# Patient Record
Sex: MALE | Race: WHITE | URBAN - METROPOLITAN AREA
[De-identification: names, ages, dates, MRNs, and addresses within clinical notes are randomized per-mention and may not be internally consistent; named-entity substitution may affect disease eponyms.]

---

## 2018-12-13 ENCOUNTER — ANCILLARY PROCEDURE (OUTPATIENT)
Dept: GENERAL RADIOLOGY | Facility: CLINIC | Age: 23
End: 2018-12-13
Attending: FAMILY MEDICINE
Payer: COMMERCIAL

## 2018-12-13 ENCOUNTER — OFFICE VISIT (OUTPATIENT)
Dept: ORTHOPEDICS | Facility: CLINIC | Age: 23
End: 2018-12-13
Payer: COMMERCIAL

## 2018-12-13 VITALS — BODY MASS INDEX: 23.1 KG/M2 | HEIGHT: 71 IN | WEIGHT: 165 LBS

## 2018-12-13 DIAGNOSIS — R10.2 PERINEUM PAIN, MALE: ICD-10-CM

## 2018-12-13 DIAGNOSIS — R10.2 NEUROPATHIC PAIN OF PERINEUM: Primary | ICD-10-CM

## 2018-12-13 DIAGNOSIS — M54.16 ACUTE LUMBAR RADICULOPATHY: ICD-10-CM

## 2018-12-13 RX ORDER — PREDNISONE 20 MG/1
40 TABLET ORAL DAILY
Qty: 10 TABLET | Refills: 0 | Status: SHIPPED | OUTPATIENT
Start: 2018-12-13 | End: 2019-03-12

## 2018-12-13 ASSESSMENT — MIFFLIN-ST. JEOR: SCORE: 1770.57

## 2018-12-13 NOTE — LETTER
12/13/2018       RE: Elias Jarrell  [address]  [facility] [address]     Dear Colleague,    Thank you for referring your patient, Elias Jarrell, to the [facility] SPORTS AND ORTHOPAEDIC WALK IN CLINIC at [facility]. Please see a copy of my visit note below.         NEW PATIENT INTAKE QUESTIONNAIRE  SPORTS & ORTHOPEDIC WALK-IN 12/13/2018    Primary Care Physician: None    Where are the majority of your medical records? [facility]  Last night while strength training, doing row exercises, felt a sharp pain start in groin/genital region. Achy pain today. Pain from sitting to standing and with flexion.    Reason for Visit:    What part of your body is injured / painful?  groin    What caused the injury /pain? Lifting weights- rowing exercise    How long ago did your injury occur or pain begin? yesterday    What are your most bothersome symptoms? Pain    How would you characterize your symptom? aching and sharp    What makes your symptoms better? Rest    What makes your symptoms worse? Movement    Have you been previously seen for this problem? No    Medical History:    Medical History: Anxiety    Have you had surgery on this body part before? No    Medications: None    Allergies: No known drug allergies    Family History of Medical Problems: Heart disease, breast cancer, Diabetes    Previous Surgeries: [place] teeth    Social History:    Occupation: Scribe    Handedness: Right    Exercise: 3-4 days/week    Review of Systems:    Have you recently had a a fever, chills, weight loss? No    Do you have any vision problems? No    Do you have any chest pain or edema? No    Do you have any shortness of breath or wheezing?  No    Do you have stomach problems? Yes, Heartburn    Do you have any numbness or focal weakness? No    Do you have diabetes? No    Do you have problems with bleeding or clotting? No    Do you have an rashes or other skin lesions? No             CHIEF  "COMPLAINT:  No chief complaint on file.       HISTORY OF PRESENT ILLNESS  Mr. Jarrell is a pleasant 23 year old year old male who presents to clinic today with pain of his groin.  Elias explains that he was at the gym lifting weights.  He completed a \"back workout\" including standing rows, lat pulldowns, and seated rows yesterday.  First noticed pain during seated rows and stopped.  He felt initially with seated rows and increased intensity with picking up an EZ-bar from the floor with lighter weights.  Discontinued workout after this time.  Describes as certain movements make it sharp, otherwise achy.  Worse with sitting to standing, rolling over, bending over and picking up things. Improved at rest. Laying on back resolves pain completely.  Does not declare a laterality fully but \"maybe slightly to the left\" and mostly central perineum area.      Mild soreness of low back.  Pain is not worse with coughing, urinating. Has not defecated.    Denies tingling sensation.    Between the legs.   No dysuria, hematuria, urinary symptoms.    No recent sexual intercourse - \"been a while\" 6 mos or so.    Treatments to date: Rest from offending activity including cessation of last night's workout.    Additional history: as documented      MEDICAL HISTORY  Anxiety    No current outpatient medications on file.       No Known Allergies    Family History of Medical Problems: Heart disease, breast cancer, DiabetesFamily History of Medical Problems: Heart disease, breast cancer, Diabetes  Additional medical/Social/Surgical histories reviewed in [facility] and updated as appropriate.     REVIEW OF SYSTEMS (12/13/2018)  CONSTITUTIONAL: Denies fever and weight loss  EYES: Denies acute vision changes  ENT: Denies hearing changes or difficulty swallowing  CARDIAC: Denies chest pain or edema  RESPIRATORY: Denies dyspnea, cough or wheeze  GASTROINTESTINAL: Denies abdominal pain, nausea, vomiting  GENITOURINARY: No hematuria, dysuria, discharge, " "lesions  MUSCULOSKELETAL: See HPI  SKIN: Denies any recent rash or lesion  NEUROLOGICAL: Denies numbness or focal weakness  PSYCHIATRIC: No history of psychiatric symptoms or problems  ENDOCRINE: Diagnosis of diabetes:No  HEMATOLOGY: Denies episodes of easy bleeding      PHYSICAL EXAM  Ht 1.803 m (5' 11\")   Wt 74.8 kg (165 lb)   BMI 23.01 kg/m       General Appearance: Well appearing, alert, in no acute distress, well-hydrated, and well nourished  HEENT  -Pupils equal, round, no conjunctival injection.  No lid lag  Skin: No rashes, lesions, or ecchymosis present of genitals and thighs  Cardiovascular: pedal pulses and radial pulses normal, no signs of upper or lower extremity edema  Respiratory: no respiratory distress, no audible wheezing, no labored breathing, symmetric thoracic excursion  : Genitals appearing without lesions. Testicles and penis nontender without masses or abnormalities. No discharge. Non-tender ductus deferens and epididymus.  No palpable bulges or impulses of inguinal canal with valsalva.  No superficial abnormalities or erythema of perineum.  Psychiatric: mood and affect are appropriate, patient is oriented to time, place and person  Musculoskeletal - lumbar spine  - stance: Normal gait and stance  - inspection: normal bone and joint alignment, no obvious scoliosis  - palpation:Mild tenderness bilateral lumbar paraspinals to palpation. No significant spasm.  - ROM: Increased intensity of perineal pain with forward flexion to 60+ degrees.  Mild increased pain with hyperextension.  Normal and painless sidebending and rotation.  - strength: lower extremities 5/5 in all planes  - special tests:  (-) straight leg raise bilaterally  (-) slump test  Neuro  - patellar and Achilles DTRs 2+ bilaterally, no sensory or motor deficit, grossly normal coordination, normal muscle tone  Skin  - no ecchymosis, erythema, warmth, or induration, no obvious rash  Psych  - interactive, appropriate, normal mood " "and affect     ASSESSMENT & PLAN  Mr. Jarrell is a 23 year old year old male who presents to clinic today with acute perineal pain and mild back aching after performing his \"back workout\" yesterday at the gym.  No symptoms or recent sexual encounters to suggest epididymitis or sexual transmitted disease.  However he will also see PCP for well male examination as it has been 5+ years.    Diagnosis: Acute perineal pain, lumbar radiculopathy suspected    -Prednisone x 5 days  -Refraining from lumbar flexion exercises  -HEP for extension based strengthening  -Worrisome or evolving symptoms discussed and will follow-up  -Follow up in 2 weeks if persisting.    It was a pleasure seeing Elias today.    Pj Robles DO, [facility]  Primary Care Sports Medicine      Again, thank you for allowing me to participate in the care of your patient.      Sincerely,    Pj Robles DO      "

## 2018-12-13 NOTE — PROGRESS NOTES
NEW PATIENT INTAKE QUESTIONNAIRE  SPORTS & ORTHOPEDIC WALK-IN 12/13/2018    Primary Care Physician: None    Where are the majority of your medical records? [facility]  Last night while strength training, doing row exercises, felt a sharp pain start in groin/genital region. Achy pain today. Pain from sitting to standing and with flexion.    Reason for Visit:    What part of your body is injured / painful?  groin    What caused the injury /pain? Lifting weights- rowing exercise    How long ago did your injury occur or pain begin? yesterday    What are your most bothersome symptoms? Pain    How would you characterize your symptom? aching and sharp    What makes your symptoms better? Rest    What makes your symptoms worse? Movement    Have you been previously seen for this problem? No    Medical History:    Medical History: Anxiety    Have you had surgery on this body part before? No    Medications: None    Allergies: No known drug allergies    Family History of Medical Problems: Heart disease, breast cancer, Diabetes    Previous Surgeries: [place] teeth    Social History:    Occupation: Scribe    Handedness: Right    Exercise: 3-4 days/week    Review of Systems:    Have you recently had a a fever, chills, weight loss? No    Do you have any vision problems? No    Do you have any chest pain or edema? No    Do you have any shortness of breath or wheezing?  No    Do you have stomach problems? Yes, Heartburn    Do you have any numbness or focal weakness? No    Do you have diabetes? No    Do you have problems with bleeding or clotting? No    Do you have an rashes or other skin lesions? No

## 2018-12-13 NOTE — PATIENT INSTRUCTIONS
WHAT IS A HERNIATED DISK?    A herniated disk is a disk that has bulged out from its proper place in your neck or back. Disks are rubbery cushions between the bones of the spine (vertebrae). Disks act as shock absorbers between each of the bones of the spine. When a disk bulges out, it may press on nearby nerves and cause pain and other symptoms.    Sometimes a herniated disk is called a ruptured disk.    WHAT IS THE CAUSE?    A herniated disk most often results from wear and tear on the spine as you get older. Sometimes it s caused by an injury. You may be more likely to have a herniated disk if you keep straining your back. This could happen, for example, from not using proper technique when you lift, push, or pull something heavy. Being overweight can also put extra stress on your back. You may also be at higher risk for a herniated disk if:    You are a smoker.  You sit for long periods of time without lower back support.  You drive a lot--for example, you are a .  WHAT ARE THE SYMPTOMS?    Symptoms of a herniated disk may start slowly or suddenly. Where you have symptoms depends on where the herniated disk is in your spine. The most common symptoms are numbness, tingling, pain, or weakness in your buttocks, shoulders, legs, or arms.    HOW IS IT DIAGNOSED?    Your healthcare provider will ask about your symptoms, activities, and medical history. Your provider will examine your spine. Tests may include:    Tests of the movement and reflexes of your arms and legs  X-rays or other types of scans of your spine  Electromyogram, which is a test of electrical activity in your muscles  HOW IS IT TREATED?    Your healthcare provider may recommend:    Rest. It's best to try to stay active, so try not to rest in bed longer than 1 to 2 days or the time your provider recommends.  Medicine. Several types of medicines may help lessen back pain. It may be medicine you take by mouth, or your provider may give a  steroid shot into your spine. Take all medicine as recommended by your healthcare provider.  Physical therapy. This may include massage, traction (force applied to your spine to help relieve pressure on your nerves), or other treatments. You may be given exercises to help strengthen your back so you are less likely to hurt it. You may learn how to protect your back when you are working or playing sports.  A neck collar or neck brace. Wearing a brace for a short time may help keep your neck in the right position while it is healing.  With treatment, the pain should get better within a few weeks, but you may keep having some pain for a few months. If you keep having symptoms, your provider may recommend surgery, but usually surgery isn t needed.    HOW CAN I TAKE CARE OF MYSELF?    To help relieve pain:    Take pain medicine according to your healthcare provider s instructions.  Put an ice pack, gel pack, or package of frozen vegetables wrapped in a cloth on the painful area every 3 to 4 hours for up to 20 minutes at a time. After a few days, a heating pad set on low, or a covered hot water bottle, may also help.  Always use good posture to keep extra pressure off your spine.    Stand up straight with your shoulders back and your belly in. If you have to stand for a long time, move around often and shift your weight from one foot to another. If possible, put one foot up on a footrest that is about 6 to 8 inches high. This keeps your back straight and puts less pressure on your spine.  Sit in chairs that give good support for your lower back Keep your feet flat on the floor or up on a foot rest. Get up every 20 minutes or so and stretch.  When you need to lift something heavy, don't bend from your waist. Bend your knees and squat down by the thing you are lifting. Keep your back as straight as possible. Use your thigh muscles instead of your back to do the lifting. Don t twist. Always keep things close to your body  when you lift, lower, or carry them.    When you sleep, find the position that s most comfortable for you and that supports your back. For example:    Lie flat on your back on a firm mattress or on a mattress with a stiff board under it. Put a pillow under your knees when you lie on your back.  Lie on your belly with a pillow under your chest  Lie on your side with a pillow between your legs.  If you cannot get comfortable, try lying flat on your back with your legs raised so that your knees are bent at a 90-degree angle. This is the same angle they would be if you were sitting up straight in a chair. One way to rest in this position is to lie on the floor, bend your knees, and rest your lower legs on the seat of a chair.  Follow your healthcare provider's instructions, including any exercises recommended by your provider. Ask your provider:    How and when you will hear your test results  How long it will take to recover  What activities you should avoid and when you can return to your normal activities  How to take care of yourself at home  What symptoms or problems you should watch for and what to do if you have them  Make sure you know when you should come back for a checkup.    HOW CAN I HELP PREVENT A HERNIATED DISK?    Keep your muscles strong so that they can help support your spine better. Walking and swimming are examples of good exercise for strengthening and protecting your spine.  Lose weight if you are overweight.  Practice good posture.  Developed by [employer].  Published by [employer].  Copyright  2014 [employer] and/or one of its subsidiaries. All rights reserved.    References        Herniated Disc Exercises    Side plank: Lie on your side with your legs, hips, and shoulders in a straight line. Prop yourself up onto your forearm with your elbow directly under your shoulder. Lift your hips off the floor and balance on your forearm and the outside of your foot. Try to hold this position  for 15 seconds and then slowly lower your hip to the ground. Switch sides and repeat. Work up to holding for 1 minute. This exercise can be made easier by starting with your knees and hips flexed toward your chest.    Gluteal stretch: Lie on your back with both knees bent. Rest your right ankle over the knee of your left leg. Grasp the thigh of the left leg and pull toward your chest. You will feel a stretch along the buttocks and possibly along the outside of your hip. Hold the stretch for 15 to 30 seconds. Then repeat the exercise with your left ankle over your right knee. Do the exercise 3 times with each leg.    Quadruped arm and leg raise: Get down on your hands and knees. Pull in your belly button and tighten your abdominal muscles to stiffen your spine. While keeping your abdominals tight, raise one arm and the opposite leg away from you. Hold this position for 5 seconds. Lower your arm and leg slowly and change sides. Do this 10 times on each side.    Extension exercise  Lie face down on the floor for 5 minutes. If this hurts too much, lie face down with a pillow under your stomach. This should relieve your leg or back pain. When you can lie on your stomach for 5 minutes without a pillow, you can continue with Part B of this exercise.    After lying on your stomach for 5 minutes, prop yourself up on your elbows for another 5 minutes. If you can do this without having more leg or buttock pain, you can start doing part C of this exercise.    Lie on your stomach with your hands under your shoulders. Then press down on your hands and extend your elbows while keeping your hips flat on the floor. Hold for 1 second and lower yourself to the floor. Do 3 to 5 sets of 10 repetitions. Rest for 1 minute between sets. You should have no pain in your legs when you do this, but it is normal to feel some pain in your lower back.    Do this exercise several times a day.    Dead bug: Lie on your back with your knees bent,  arms at your sides, and feet flat on the floor. Draw in your abdomen and tighten your abdominal muscles. While keeping your abdominal muscles tight and knees bent, lift one leg several inches off the floor, hold for 5 seconds, and then lower it. Repeat this exercise with the opposite leg. Then lift your arm over your head, hold for 5 seconds, and then lower it. Repeat with the opposite arm. Do 5 repetitions with each leg and arm.  Once this exercise gets easy, raise one leg and the opposite arm together. Hold for 5 seconds. Lower your arm and leg and raise the opposite arm and leg up and hold for 5 seconds. Do 3 sets of 5 repetitions.    Walking is also good exercise for you.    If you have a herniated disk, you should not drive or sit for more than 30 minutes at a time.    Developed by [employer].  Published by [employer].  Copyright  2014 [employer] and/or one of its subsidiaries. All rights reserved.

## 2018-12-13 NOTE — LETTER
Date:December 19, 2018      Patient was self referred, no letter generated. Do not send.        [facility] Information

## 2018-12-13 NOTE — PROGRESS NOTES
"CHIEF COMPLAINT:  No chief complaint on file.       HISTORY OF PRESENT ILLNESS  Mr. Jarrell is a pleasant 23 year old year old male who presents to clinic today with pain of his groin.  Elias explains that he was at the gym lifting weights.  He completed a \"back workout\" including standing rows, lat pulldowns, and seated rows yesterday.  First noticed pain during seated rows and stopped.  He felt initially with seated rows and increased intensity with picking up an EZ-bar from the floor with lighter weights.  Discontinued workout after this time.  Describes as certain movements make it sharp, otherwise achy.  Worse with sitting to standing, rolling over, bending over and picking up things. Improved at rest. Laying on back resolves pain completely.  Does not declare a laterality fully but \"maybe slightly to the left\" and mostly central perineum area.      Mild soreness of low back.  Pain is not worse with coughing, urinating. Has not defecated.    Denies tingling sensation.    Between the legs.   No dysuria, hematuria, urinary symptoms.    No recent sexual intercourse - \"been a while\" 6 mos or so.    Treatments to date: Rest from offending activity including cessation of last night's workout.    Additional history: as documented      MEDICAL HISTORY  Anxiety    No current outpatient medications on file.       No Known Allergies    Family History of Medical Problems: Heart disease, breast cancer, DiabetesFamily History of Medical Problems: Heart disease, breast cancer, Diabetes  Additional medical/Social/Surgical histories reviewed in [facility] and updated as appropriate.     REVIEW OF SYSTEMS (12/13/2018)  CONSTITUTIONAL: Denies fever and weight loss  EYES: Denies acute vision changes  ENT: Denies hearing changes or difficulty swallowing  CARDIAC: Denies chest pain or edema  RESPIRATORY: Denies dyspnea, cough or wheeze  GASTROINTESTINAL: Denies abdominal pain, nausea, vomiting  GENITOURINARY: No hematuria, dysuria, " "discharge, lesions  MUSCULOSKELETAL: See HPI  SKIN: Denies any recent rash or lesion  NEUROLOGICAL: Denies numbness or focal weakness  PSYCHIATRIC: No history of psychiatric symptoms or problems  ENDOCRINE: Diagnosis of diabetes:No  HEMATOLOGY: Denies episodes of easy bleeding      PHYSICAL EXAM  Ht 1.803 m (5' 11\")   Wt 74.8 kg (165 lb)   BMI 23.01 kg/m      General Appearance: Well appearing, alert, in no acute distress, well-hydrated, and well nourished  HEENT  -Pupils equal, round, no conjunctival injection.  No lid lag  Skin: No rashes, lesions, or ecchymosis present of genitals and thighs  Cardiovascular: pedal pulses and radial pulses normal, no signs of upper or lower extremity edema  Respiratory: no respiratory distress, no audible wheezing, no labored breathing, symmetric thoracic excursion  : Genitals appearing without lesions. Testicles and penis nontender without masses or abnormalities. No discharge. Non-tender ductus deferens and epididymus.  No palpable bulges or impulses of inguinal canal with valsalva.  No superficial abnormalities or erythema of perineum.  Psychiatric: mood and affect are appropriate, patient is oriented to time, place and person  Musculoskeletal - lumbar spine  - stance: Normal gait and stance  - inspection: normal bone and joint alignment, no obvious scoliosis  - palpation:Mild tenderness bilateral lumbar paraspinals to palpation. No significant spasm.  - ROM: Increased intensity of perineal pain with forward flexion to 60+ degrees.  Mild increased pain with hyperextension.  Normal and painless sidebending and rotation.  - strength: lower extremities 5/5 in all planes  - special tests:  (-) straight leg raise bilaterally  (-) slump test  Neuro  - patellar and Achilles DTRs 2+ bilaterally, no sensory or motor deficit, grossly normal coordination, normal muscle tone  Skin  - no ecchymosis, erythema, warmth, or induration, no obvious rash  Psych  - interactive, appropriate, " "normal mood and affect     ASSESSMENT & PLAN  Mr. Jarrell is a 23 year old year old male who presents to clinic today with acute perineal pain and mild back aching after performing his \"back workout\" yesterday at the gym.  No symptoms or recent sexual encounters to suggest epididymitis or sexual transmitted disease.  However he will also see PCP for well male examination as it has been 5+ years.    Diagnosis: Acute perineal pain, lumbar radiculopathy suspected    -Prednisone x 5 days  -Refraining from lumbar flexion exercises  -HEP for extension based strengthening  -Worrisome or evolving symptoms discussed and will follow-up  -Follow up in 2 weeks if persisting.    It was a pleasure seeing Elias today.    Pj Robles DO, [facility]  Primary Care Sports Medicine    "

## 2019-01-22 ENCOUNTER — OFFICE VISIT (OUTPATIENT)
Dept: FAMILY MEDICINE | Facility: CLINIC | Age: 24
End: 2019-01-22
Payer: COMMERCIAL

## 2019-01-22 VITALS
SYSTOLIC BLOOD PRESSURE: 115 MMHG | HEART RATE: 73 BPM | HEIGHT: 70 IN | BODY MASS INDEX: 23.92 KG/M2 | RESPIRATION RATE: 20 BRPM | DIASTOLIC BLOOD PRESSURE: 72 MMHG | TEMPERATURE: 98.4 F | WEIGHT: 167.1 LBS | OXYGEN SATURATION: 96 %

## 2019-01-22 DIAGNOSIS — Z00.00 HEALTH CARE MAINTENANCE: Primary | ICD-10-CM

## 2019-01-22 DIAGNOSIS — F41.9 ANXIETY: ICD-10-CM

## 2019-01-22 RX ORDER — CALCIUM CARBONATE 500 MG/1
1 TABLET, CHEWABLE ORAL 2 TIMES DAILY PRN
COMMUNITY

## 2019-01-22 RX ORDER — CETIRIZINE HYDROCHLORIDE 10 MG/1
10 TABLET ORAL DAILY
COMMUNITY

## 2019-01-22 SDOH — HEALTH STABILITY: MENTAL HEALTH: HOW MANY STANDARD DRINKS CONTAINING ALCOHOL DO YOU HAVE ON A TYPICAL DAY?: 3 OR 4

## 2019-01-22 SDOH — HEALTH STABILITY: MENTAL HEALTH: HOW OFTEN DO YOU HAVE A DRINK CONTAINING ALCOHOL?: 2-3 TIMES A WEEK

## 2019-01-22 ASSESSMENT — ENCOUNTER SYMPTOMS
PANIC: 0
SINUS PAIN: 0
BACK PAIN: 1
EYE PAIN: 0
INSOMNIA: 0
HEARTBURN: 1
DOUBLE VISION: 0
DIARRHEA: 1
NERVOUS/ANXIOUS: 1
JOINT SWELLING: 0
POOR WOUND HEALING: 0
MUSCLE WEAKNESS: 0
STIFFNESS: 0
BLOATING: 1
MUSCLE CRAMPS: 0
TROUBLE SWALLOWING: 0
NAUSEA: 0
NAIL CHANGES: 0
SORE THROAT: 0
EYE WATERING: 0
SKIN CHANGES: 0
NECK PAIN: 0
EYE REDNESS: 0
CONSTIPATION: 0
DEPRESSION: 0
RECTAL PAIN: 0
BOWEL INCONTINENCE: 0
JAUNDICE: 0
ABDOMINAL PAIN: 0
HOARSE VOICE: 0
SMELL DISTURBANCE: 0
ARTHRALGIAS: 1
MYALGIAS: 0
NECK MASS: 0
VOMITING: 0
TASTE DISTURBANCE: 0
BLOOD IN STOOL: 0
EYE IRRITATION: 1
SINUS CONGESTION: 1
DECREASED CONCENTRATION: 0

## 2019-01-22 ASSESSMENT — MIFFLIN-ST. JEOR: SCORE: 1759.21

## 2019-01-22 ASSESSMENT — PAIN SCALES - GENERAL: PAINLEVEL: NO PAIN (0)

## 2019-01-22 NOTE — PROGRESS NOTES
[facility]   Ric Mcadams MD  01/22/2019      Chief Complaint:   Establish Care and Physical     History of Present Illness:   Elias Jarrell is a 24 year old male who presents to establish care and for a physical exam.     Anxiety  Elias states that he has a history of panic attacks around age 17. He had an EKG and Echo done at that time, and states they were all normal. He states that he has not had panic attacks in a while but experiences increased anxiety in new situations. He was previously prescribed a medication, but is unsure of what it was. He has seen a therapist in the past, but does not feel it was helpful. He feels that he has learned about himself and is better at handling his anxiety, but is interested in seeing a therapist. He does experience some baseline anxiety but describes his anxiety as mainly situational, and that his last bout of anxiety was around graduation in 05/2018. He mentions that he usually sleeps well.     Possible acid reflux  Elias mentions feeling a pain/burning sensation after drinking coffee or eating an orange. He notes this resolves with Tums, which he takes twice daily. He does occasionally drink milk, which helps somewhat. He feels that this is most bothersome when he goes to the gym. He states that he experience these symptoms approximately 50% of the time he works out. He believes he has acid reflux, and is wondering if he can do anything else to address this.     Other concerns discussed:  - Elias has had his flu vaccination, and believes he is up to date on tetanus. He believes he is due for his third HPV shot.   - Elias states that he eats a relatively healthy diet and consumes fruits and whole grains frequently. He also does weight lifting at the gym.   - Takes cetirizine PRN for runny nose/congestion.  - Elias mentioned that he thought he had a hernia and saw sports medicine. He says they thought it was an inflamed disc, and was given a course  of Prednisone which resolved his pain. He believes this was an injury caused by weight lifting.     Review of Systems:   Pertinent items are noted in HPI, remainder of complete ROS is negative.      Answers for HPI/ROS submitted by the patient on 1/22/2019   General Symptoms: No  Skin Symptoms: Yes  HENT Symptoms: Yes  EYE SYMPTOMS: Yes  HEART SYMPTOMS: No  LUNG SYMPTOMS: No  INTESTINAL SYMPTOMS: Yes  URINARY SYMPTOMS: No  REPRODUCTIVE SYMPTOMS: No  SKELETAL SYMPTOMS: Yes  BLOOD SYMPTOMS: No  NERVOUS SYSTEM SYMPTOMS: No  MENTAL HEALTH SYMPTOMS: Yes  Changes in hair: No  Changes in moles/birth marks: No  Itching: No  Rashes: No  Changes in nails: No  Acne: Yes  Change in facial hair: No  Warts: No  Non-healing sores: No  Scarring: No  Flaking of skin: No  Color changes of hands/feet in cold : No  Sun sensitivity: No  Skin thickening: No  Ear pain: No  Ear discharge: No  Hearing loss: No  Tinnitus: No  Nosebleeds: No  Congestion: Yes  Sinus pain: No  Trouble swallowing: No   Voice hoarseness: No  Mouth sores: No  Sore throat: No  Tooth pain: No  Gum tenderness: No  Bleeding gums: No  Change in taste: No  Change in sense of smell: No  Dry mouth: No  Hearing aid used: No  Neck lump: No  Eye pain: No  Vision loss: No  Dry eyes: Yes  Watery eyes: No  Eye bulging: No  Double vision: No  Flashing of lights: No  Spots: No  Floaters: Yes  Redness: No  Crossed eyes: No  Tunnel Vision: No  Yellowing of eyes: No  Eye irritation: Yes  Heart burn or indigestion: Yes  Nausea: No  Vomiting: No  Abdominal pain: No  Bloating: Yes  Constipation: No  Diarrhea: Yes  Blood in stool: No  Black stools: No  Rectal or Anal pain: No  Fecal incontinence: No  Yellowing of skin or eyes: No  Vomit with blood: No  Change in stools: Yes  Back pain: Yes  Muscle aches: No  Neck pain: No  Swollen joints: No  Joint pain: Yes  Bone pain: No  Muscle cramps: No  Muscle weakness: No  Joint stiffness: No  Bone fracture: No  Nervous or Anxious:  "Yes  Depression: No  Trouble sleeping: No  Trouble thinking or concentrating: No  Mood changes: No  Panic attacks: No    Active Medications:      calcium carbonate (TUMS) 500 MG chewable tablet, Take 1 chew tab by mouth 2 times daily as needed for heartburn, Disp: , Rfl:      cetirizine (ZYRTEC) 10 MG tablet, Take 10 mg by mouth daily, Disp: , Rfl:       Allergies:   Patient has no known allergies.      Past Medical History:  Panic attacks     Past Surgical History:  No past surgical history on file.    Family History:   Breast cancer - maternal grandmother    Social History:   Elias is a recent graduate from the [employer], currently applying to medical school. He is originally from [place], but now lives in the [facility]. He is not a tobacco smoker, and does consume alcohol.      Physical Exam:   /72 (BP Location: Right arm, Patient Position: Sitting, Cuff Size: Adult Regular)   Pulse 73   Temp 98.4  F (36.9  C) (Oral)   Resp 20   Ht 1.778 m (5' 10\")   Wt 75.8 kg (167 lb 1.6 oz)   SpO2 96%   BMI 23.98 kg/m       Constitutional: Alert. In no distress.  Head: Normocephalic. No masses, lesions, tenderness or abnormalities.  ENT: No neck nodes or sinus tenderness.  Cardiovascular: RRR. No murmurs, clicks, gallops, or rub.  Respiratory: Clear to auscultation bilaterally, no wheezes or crackles.  Gastrointestinal: Abdomen soft. Non-tender. BS normal. No masses or organomegaly.  Musculoskeletal: Extremities normal. No gross deformities noted. Normal muscle tone.  Skin: No suspicious lesions. No rashes.  Neurologic: Gait normal. Reflexes normal and symmetric. Sensation grossly WNL.  Psychiatric: Mentation appears normal. Normal affect.   Hematologic/Lymphatic/Immunologic: Normal cervical lymph nodes.      Assessment and Plan:  Health care maintenance  - HPV VACCINE (GARDASIL 9), 9 VALENT    Anxiety  - BEHAVIORAL / SPIRITUAL HEALTH ([facility] ONLY)    He did point out several pigmented " lesions on trunk. These appeared to be benign moles. He will keep an eye on them for any changes.      Follow-up: Return in about 1 year (around 1/22/2020).      Scribe Disclosure:   I, Puja Doan, am serving as a scribe to document services personally performed by Ric Mcadams MD at this visit, based upon the provider's statements to me. All documentation has been reviewed by the aforementioned provider prior to being entered into the official medical record.     Portions of this medical record were completed by a scribe. UPON MY REVIEW AND AUTHENTICATION BY ELECTRONIC SIGNATURE, this confirms (a) I performed the applicable clinical services, and (b) the record is accurate.   Ric Mcadams MD

## 2019-01-22 NOTE — NURSING NOTE
Chief Complaint   Patient presents with     Establish Care     Patient is here to establish care for PCP     Physical     Also here for physical exam       Ulises Marks CMA ([facility]) at 9:20 AM on 1/22/2019

## 2019-01-22 NOTE — NURSING NOTE
Administered HPV-9 vaccine (see Immunizations in Chart Review). Patient tolerated well.  Ulises Marks CMA at 9:57 AM on 1/22/2019

## 2019-02-25 ENCOUNTER — OFFICE VISIT (OUTPATIENT)
Dept: BEHAVIORAL HEALTH | Facility: CLINIC | Age: 24
End: 2019-02-25
Payer: COMMERCIAL

## 2019-02-25 DIAGNOSIS — F41.1 GENERALIZED ANXIETY DISORDER: Primary | ICD-10-CM

## 2019-02-25 ASSESSMENT — PATIENT HEALTH QUESTIONNAIRE - PHQ9
10. IF YOU CHECKED OFF ANY PROBLEMS, HOW DIFFICULT HAVE THESE PROBLEMS MADE IT FOR YOU TO DO YOUR WORK, TAKE CARE OF THINGS AT HOME, OR GET ALONG WITH OTHER PEOPLE: SOMEWHAT DIFFICULT
SUM OF ALL RESPONSES TO PHQ QUESTIONS 1-9: 5
SUM OF ALL RESPONSES TO PHQ QUESTIONS 1-9: 5

## 2019-02-25 ASSESSMENT — ANXIETY QUESTIONNAIRES
5. BEING SO RESTLESS THAT IT IS HARD TO SIT STILL: NOT AT ALL
GAD7 TOTAL SCORE: 2
3. WORRYING TOO MUCH ABOUT DIFFERENT THINGS: NOT AT ALL
GAD7 TOTAL SCORE: 2
4. TROUBLE RELAXING: NOT AT ALL
GAD7 TOTAL SCORE: 2
6. BECOMING EASILY ANNOYED OR IRRITABLE: SEVERAL DAYS
7. FEELING AFRAID AS IF SOMETHING AWFUL MIGHT HAPPEN: NOT AT ALL
7. FEELING AFRAID AS IF SOMETHING AWFUL MIGHT HAPPEN: NOT AT ALL
2. NOT BEING ABLE TO STOP OR CONTROL WORRYING: NOT AT ALL
1. FEELING NERVOUS, ANXIOUS, OR ON EDGE: SEVERAL DAYS
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
## 2019-02-26 ASSESSMENT — PATIENT HEALTH QUESTIONNAIRE - PHQ9: SUM OF ALL RESPONSES TO PHQ QUESTIONS 1-9: 5

## 2019-02-26 ASSESSMENT — ANXIETY QUESTIONNAIRES: GAD7 TOTAL SCORE: 2

## 2019-03-05 PROBLEM — F41.1 GENERALIZED ANXIETY DISORDER: Status: ACTIVE | Noted: 2019-03-05

## 2019-03-12 ENCOUNTER — OFFICE VISIT (OUTPATIENT)
Dept: FAMILY MEDICINE | Facility: CLINIC | Age: 24
End: 2019-03-12
Payer: COMMERCIAL

## 2019-03-12 VITALS
RESPIRATION RATE: 16 BRPM | HEART RATE: 79 BPM | WEIGHT: 171.6 LBS | TEMPERATURE: 98.5 F | BODY MASS INDEX: 24.02 KG/M2 | OXYGEN SATURATION: 97 % | DIASTOLIC BLOOD PRESSURE: 75 MMHG | HEIGHT: 71 IN | SYSTOLIC BLOOD PRESSURE: 135 MMHG

## 2019-03-12 DIAGNOSIS — Z11.3 SCREEN FOR STD (SEXUALLY TRANSMITTED DISEASE): ICD-10-CM

## 2019-03-12 DIAGNOSIS — J30.9 ALLERGIC RHINITIS, UNSPECIFIED SEASONALITY, UNSPECIFIED TRIGGER: Primary | ICD-10-CM

## 2019-03-12 DIAGNOSIS — Z11.4 SCREENING FOR HIV (HUMAN IMMUNODEFICIENCY VIRUS): ICD-10-CM

## 2019-03-12 DIAGNOSIS — J02.9 SORE THROAT: ICD-10-CM

## 2019-03-12 LAB
HIV 1+2 AB+HIV1 P24 AG SERPL QL IA: NONREACTIVE
INTERNAL QC OK POCT: YES
S PYO AG THROAT QL IA.RAPID: NORMAL

## 2019-03-12 ASSESSMENT — PAIN SCALES - GENERAL: PAINLEVEL: MILD PAIN (2)

## 2019-03-12 ASSESSMENT — MIFFLIN-ST. JEOR: SCORE: 1795.5

## 2019-03-12 NOTE — NURSING NOTE
Chief Complaint   Patient presents with     Pharyngitis     Pt complains of a sore throat lasting for the past week and a half.         Nba Moseley, EMT on 3/12/2019 at 7:02 AM

## 2019-03-12 NOTE — PROGRESS NOTES
SUBJECTIVE:   Elias Jarrell is a 24 year old male who presents to clinic today for the following health issues:     1. Sore throat    Patient has had 2 weeks of sore throat, with two days of runny nose with post nasal drip, ear fullness, cervical lymph tenderness, and mild sinus pressure. Symptoms are worse in the morning. States his new partner has a dog and he has been with her for 2 weeks. He has a history of seasonal allergies, but doesn't know what he allergic to. 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 Patient has tried OTC throat lozenges and dental mouthwash. Denies fever, chills, malaise, headache, runny nose, cough, shortness of breath, or chest pain. Denies nausea, vomiting, or diarrhea. Patient has a new sexual partner, uses condoms every time but wishes to have STI testing today.       Problem list and histories reviewed & adjusted, as indicated.  Additional history: as documented    Patient Active Problem List   Diagnosis     Generalized anxiety disorder     No past surgical history on file.    Social History     Tobacco Use     Smoking status: Never Smoker     Smokeless tobacco: Never Used   Substance Use Topics     Alcohol use: Yes     Alcohol/week: 4.8 oz     Types: 8 Cans of beer per week     Frequency: 2-3 times a week     Drinks per session: 3 or 4     Family History   Problem Relation Age of Onset     Breast Cancer Maternal Grandmother      Diabetes Maternal Grandfather      CABG Paternal Grandfather          Current Outpatient Medications   Medication Sig Dispense Refill     calcium carbonate (TUMS) 500 MG chewable tablet Take 1 chew tab by mouth 2 times daily as needed for heartburn       cetirizine (ZYRTEC) 10 MG tablet Take 10 mg by mouth daily       Allergies   Allergen Reactions     Seasonal Allergies        Reviewed and updated as needed this visit by clinical staff  Tobacco  Allergies  Meds       Reviewed and updated as needed this visit  "by Provider      ROS:  CONSTITUTIONAL: NEGATIVE for fever, chills, change in weight  ENT/MOUTH: POSITIVE for sore throat and swollen glands, maxillary sinus pressure, ear fullness, and post nasal drip  RESP: NEGATIVE for significant cough or SOB  CV: NEGATIVE for chest pain, palpitations or peripheral edema    OBJECTIVE:     /75 (BP Location: Right arm, Patient Position: Sitting, Cuff Size: Adult Regular)   Pulse 79   Temp 98.5  F (36.9  C) (Oral)   Resp 16   Ht 1.803 m (5' 11\")   Wt 77.8 kg (171 lb 9.6 oz)   SpO2 97%   BMI 23.93 kg/m    Body mass index is 23.93 kg/m .  GENERAL: healthy, alert and no distress  HENT: ear canals and TM's normal, nose and mouth without ulcers or lesions. Nasal turbinates pale. Oropharynx erythematous without exudate.  NECK: no adenopathy, no asymmetry, masses, or scars   RESP: lungs clear to auscultation - no rales, rhonchi or wheezes  CV: regular rate and rhythm, normal S1 S2, no S3 or S4, no murmur, click or rub, no peripheral edema and peripheral pulses strong    Diagnostic Test Results:  Strep screen - negative    ASSESSMENT/PLAN:   Elias was seen today for pharyngitis.    Diagnoses and all orders for this visit:      ICD-10-CM    1. Allergic rhinitis, unspecified seasonality, unspecified trigger J30.9 - Likely allergic rhinitis due to history of seasonal allergies and recent exposure to dogs.   -Start fluticasone nasal spray, 2 sprays per nostril once daily  -Continue taking cetirixine 10 mg by mouth once daily  -Encouraged supportive cares which include throat lozenges, saline gargle, adequate fluid intake, hand hygiene when exposed to allergens, and avoiding triggers.        2. Sore throat J02.9 Rapid strep screen POCT   3. Screen for STD (sexually transmitted disease) Z11.3 NEISSERIA GONORRHOEA PCR     CHLAMYDIA TRACHOMATIS PCR     HIV Antigen Antibody Combo   4. Screening for HIV (human immunodeficiency virus) Z11.4 HIV Antigen Antibody Combo       Screening for " STI  Patient requested STI testing, has never been tested in the past and has a new partner. Asymptomatic.  -Gonorrhea and Chlamydia Urine- pending  -HIV serum- pending    Clementine Vazquez FNP Student RAQUEL    I was present with the NPP student who participated in the service and in the documentation of the services provided. I have verified the history and personally performed the physical exam and medical decision making, as documented by the student and edited by me    AAYUSH Hernandez CNP  03/12/19  8:21 AM    Follow-up if symptoms do not improve or worse. Trial of fluticasone and/or anti-histamine of choice x 2 weeks, continue if helpful.  Return to clinic if no improvement or symptoms worsen.  Patient verbalized understanding & agreed with plan of care.    AAYUSH Hernandez, CNP  [facility]

## 2019-03-12 NOTE — PATIENT INSTRUCTIONS
Fluticasone 2 sprays/nostril daily.         [facility] Medication Refill Request Information:  * Please contact your pharmacy regarding ANY request for medication refills.  ** [facility] Clinic Prescription Fax = [phone]  * Please allow 3 business days for routine medication refills.  * Please allow 5 business days for controlled substance medication refills.     [facility] Test Result notification information:  *You will be notified with in 7-10 days of your appointment day regarding the results of your test.  If you are on MyChart you will be notified as soon as the provider has reviewed the results and signed off on them.    [facility]: [phone]             Patient Education     Controlling Allergens: Pets  Constant exposure to allergens means constant allergy symptoms. That s why controlling or avoiding the allergens that cause your symptoms is an important part of your treatment. If you are allergic to pets, the tips below may help lessen your exposure.     Brush your pet often to reduce dander.   Pet allergies  Many people think that pet allergy is caused by the fur of cats and dogs. But researchers have found that the major allergens are proteins made by oil glands in the animals  skin. These proteins are shed in flakes of skin called dander. Allergy-causing proteins in saliva stick to the fur when the animal cleans itself. And urine contains allergy-causing proteins. Cats tend to be more likely than dogs to cause allergic reactions--this may be because they lick themselves more, may be held more, and may spend more time indoors. Guinea pigs, mice, and rats can also cause allergies.  Controlling animal allergens  The best way to avoid animal allergens is to not have a pet. If you already have a pet and want to keep it, try to reduce your exposure as much as possible. These tips may help:    Whenever possible, keep pets outdoors.  This doesn't keep pet dander  from getting into your home on clothing or shoes.    Never let pets into your bedroom or on your bed.    Try to keep pets off sofas, chairs, rugs, and carpeting. Also, consider removing carpets.    Use an air-cleaning unit with a HEPA filter--especially in the bedroom.    Use filter bags or vacuums designed to lessen allergens.    Wash your hands after you touch a pet, and try to keep pets away from your face.    Brush and bathe your pet often. Bathing pets helps lessen dander. Bathing also washes other allergens like dust, mold, and pollen off the animal s fur.  Date Last Reviewed: 9/1/2016 2000-2018 [employer]. [address] [place] [address], [place] [address]. All rights reserved. This information is not intended as a substitute for professional medical care. Always follow your healthcare professional's instructions.           Patient Education     Allergic Rhinitis  Allergic rhinitis is an allergic reaction that affects the nose, and often the eyes. It s often known as nasal allergies. Nasal allergies are often due to things in the environment that are breathed in. Depending what you are sensitive to, nasal allergies may occur only during certain seasons. Or they may occur year round. Common indoor allergens include house dust mites, mold, cockroaches, and pet dander. Outdoor allergens include pollen from trees, grasses, and weeds.   Symptoms include a drippy, stuffy, and itchy nose. They also include sneezing and red and itchy eyes. You may feel tired more often. Severe allergies may also affect your breathing and trigger a condition called asthma.   Tests can be done to see what allergens are affecting you. You may be referred to an allergy specialist for testing and further evaluation.  Home care  Your healthcare provider may prescribe medicines to help relieve allergy symptoms. These may include oral medicines, nasal sprays, or eye drops.  Ask your provider for advice on how to avoid substances  that you are allergic to. Below are a few tips for each type of allergen.  Pet dander:    Do not have pets with fur and feathers.    If you can't avoid having a pet, keep it out of your bedroom and off upholstered furniture.  Pollen:    When pollen counts are high, keep windows of your car and home closed. If possible, use an air conditioner instead.    Wear a filter mask when mowing or doing yard work.  House dust mites:    Wash bedding every week in warm water and detergent and dry on a hot setting.    Cover the mattress, box spring, and pillows with allergy covers.     If possible, sleep in a room with no carpet, curtains, or upholstered furniture.  Cockroaches:    Store food in sealed containers.    Remove garbage from the home promptly.    Fix water leaks  Mold:    Keep humidity low by using a dehumidifier or air conditioner. Keep the dehumidifier and air conditioner clean and free of mold.    Clean moldy areas with bleach and water.  In general:    Vacuum once or twice a week. If possible, use a vacuum with a high-efficiency particulate air (HEPA) filter.    Do not smoke. Avoid cigarette smoke. Cigarette smoke is an irritant that can make symptoms worse.  Follow-up care  Follow up as advised by the healthcare provider or our staff. If you were referred to an allergy specialist, make this appointment promptly.  When to seek medical advice  Call your healthcare provider right away if the following occur:    Coughing or wheezing    Fever of 100.4 F (38 C) or higher, or as directed by your healthcare provider    Raised red bumps (hives)    Continuing symptoms, new symptoms, or worsening symptoms  Call 911 if you have:    Trouble breathing    Severe swelling of the face or severe itching of the eyes or mouth  Date Last Reviewed: 3/1/2017    7048-1378 [employer]. [address] [facility], [place] [address]. All rights reserved. This information is not intended as a substitute for professional medical  care. Always follow your healthcare professional's instructions.

## 2019-03-13 LAB
C TRACH DNA SPEC QL NAA+PROBE: NEGATIVE
N GONORRHOEA DNA SPEC QL NAA+PROBE: NEGATIVE
SPECIMEN SOURCE: NORMAL
SPECIMEN SOURCE: NORMAL

## 2019-03-15 ENCOUNTER — OFFICE VISIT (OUTPATIENT)
Dept: BEHAVIORAL HEALTH | Facility: CLINIC | Age: 24
End: 2019-03-15
Payer: COMMERCIAL

## 2019-03-15 DIAGNOSIS — F41.1 GENERALIZED ANXIETY DISORDER: Primary | ICD-10-CM

## 2019-03-15 ASSESSMENT — PATIENT HEALTH QUESTIONNAIRE - PHQ9
SUM OF ALL RESPONSES TO PHQ QUESTIONS 1-9: 5
10. IF YOU CHECKED OFF ANY PROBLEMS, HOW DIFFICULT HAVE THESE PROBLEMS MADE IT FOR YOU TO DO YOUR WORK, TAKE CARE OF THINGS AT HOME, OR GET ALONG WITH OTHER PEOPLE: NOT DIFFICULT AT ALL
SUM OF ALL RESPONSES TO PHQ QUESTIONS 1-9: 5

## 2019-03-15 ASSESSMENT — ANXIETY QUESTIONNAIRES
7. FEELING AFRAID AS IF SOMETHING AWFUL MIGHT HAPPEN: NOT AT ALL
2. NOT BEING ABLE TO STOP OR CONTROL WORRYING: NOT AT ALL
5. BEING SO RESTLESS THAT IT IS HARD TO SIT STILL: NOT AT ALL
3. WORRYING TOO MUCH ABOUT DIFFERENT THINGS: NOT AT ALL
4. TROUBLE RELAXING: SEVERAL DAYS
GAD7 TOTAL SCORE: 3
1. FEELING NERVOUS, ANXIOUS, OR ON EDGE: SEVERAL DAYS
GAD7 TOTAL SCORE: 3
6. BECOMING EASILY ANNOYED OR IRRITABLE: SEVERAL DAYS
7. FEELING AFRAID AS IF SOMETHING AWFUL MIGHT HAPPEN: NOT AT ALL
GAD7 TOTAL SCORE: 3

## 2019-03-15 NOTE — PROGRESS NOTES
[facility] (PCC referral)  March 15, 2019        Behavioral Health Clinician Progress Note    Patient Name: Elias Jarrell           Service Type:  Individual      Service Location:   Face to Face in Clinic     Session Start Time: 1010am  Session End Time: 1110am      Session Length: 53 - 60      Attendees: Patient    Visit Activities (Refresh list every visit): [facility] Only    Diagnostic Assessment Date: 2/25/2019  Treatment Plan Review Date: 2/25/2019  See Flowsheets for today's PHQ-9 and KAROL-7 results  Previous PHQ-9:   PHQ-9 SCORE 2/25/2019 3/15/2019   PHQ-9 Total Score MyChart 5 (Mild depression) 5 (Mild depression)   PHQ-9 Total Score 5 5     Previous KAROL-7:   KAROL-7 SCORE 2/25/2019 3/15/2019   Total Score 2 (minimal anxiety) 3 (minimal anxiety)   Total Score 2 3       SIMONA LEVEL:  No flowsheet data found.    DATA  Extended Session (60+ minutes): No  Interactive Complexity: No  Crisis: No  [facility] Patient: No    Treatment Objective(s) Addressed in This Session:  Target Behavior(s): disease management/lifestyle changes      Patient  will experience a reduction in anxiety, will develop more effective coping skills to manage anxiety symptoms, will develop healthy cognitive patterns and beliefs and will increase ability to function adaptively    Current Stressors / Issues:  [facility] met with Elias to provide psychotherapy support regarding anxiety and stress.      We continued our conversation about Elias's current stress and worry, as well as his interest in his developmental history and how it ma impact or give insight into his current patterns of anxiety. We discussed topics from our last visit in more depth, and also explored some other experiences from his memories of elementary school and later. He identifies some issues with avoidance in elementary school, challenges interacting with some peers, concerns as he moved through developmental phases. We discussed ideas about how a sense of self-doubt can  emerge from some of the things he has been through, how shame of one form or another is a common experience that then takes on a life of its own in terms of inhibiting activity, or causing cognitive distortions. Normalized his experience at some length, discussed others' experience with recovery from childhood developmental traumas, explored questions and issues related to cultural expectations, friendship, etc.    Talked about visit home this coming weekend that can be a source of some stress for him - explored patterns form previous times, discussed ideas from Bowenian relationship theory, encouraged self-compassion and self-observation, reviewed CBT strategies for identifying and working with cognitive distortions.    I affirmed the steps this patient has taken to address physical and behavioral health issues, and offered continued behavioral health services or referral, now or in the future, as needed by the patient.    Progress on Treatment Objective(s) / Homework:  Satisfactory progress - ACTION (Actively working towards change); Intervened by reinforcing change plan / affirming steps taken    Psycho-education regarding mental health diagnoses and treatment options    Motivational Interviewing    Skills training    Explored specific skills useful to client in current situation    Skill areas include assertiveness, communication, conflict management, problem-solving, relaxation, etc.     Solution-Focused Therapy    Explored patterns in patient's behaviors and relationships and discussed options for new behaviors    Explored new options for problem-solving, communication, managing stress, etc.    Cognitive-behavioral Therapy    Discussed common cognitive distortions, identified them in patient's life    Explored ways to challenge, replace, and act against these cognitions    Explore behavioral changes that might benefit patient in improving mood and engage in meaningful activity    Acceptance and Commitment  Therapy    Explored and identified important values in patient's life    Discussed ways to commit to behavioral activation around these values    Psychodynamic psychotherapy    Discussed patient's emotional dynamics and issues and how they impact behaviors    Explored patient's history of relationships and how they impact present behaviors    Explored how to work with and make changes in these schemas and patterns    Narrative Therapy    Explored the patient's story of their life from their perspective,     Explored alternate ways of understanding their experience, identifying exceptions, developing new themes    Interpersonal Psychotherapy    Explored patterns in relationships that are effective or ineffective at helping patient reach their goals, find satisfying experience.    Discussed new patterns or behaviors to engage in for improved social functioning.    Behavioral Activation    Discussed steps patient can take to become more involved in meaningful activity    Identified barriers to these activities and explored possible solutions    Mindfulness-Based Strategies    Discussed skills based on development and application of mindfulness    Skills drawn from compassion-focused therapy, dialectical behavior therapy, mindfulness-based stress reduction, mindfulness-based cognitive therapy, etc.    Medication Review:  No problems reported to [facility] today.    Medication Compliance:  No problems reported to [facility] today.    Changes in Health Issues:  No problems reported to [facility] today.    Chemical Use Review:   Substance Use: Chemical use reviewed, no active concerns identified      Tobacco Use: No current tobacco use.      Assessment: Current Emotional / Mental Status (status of significant symptoms):  Risk status (Self / Other harm or suicidal ideation)  Patient denies a history of suicidal ideation, suicide attempts, self-injurious behavior, homicidal ideation, homicidal behavior and and other safety concerns  Patient  denies current fears or concerns for personal safety.  Patient denies current or recent suicidal ideation or behaviors.  Patient denies current or recent homicidal ideation or behaviors.  Patient denies current or recent self injurious behavior or ideation.  Patient denies other safety concerns.  A safety and risk management plan has not been developed at this time, however patient was encouraged to call [place] [phone] should there be a change in any of these risk factors.    Appearance:   Appropriate   Eye Contact:   Good   Psychomotor Behavior: Normal   Attitude:   Cooperative   Orientation:   All  Speech   Rate / Production: Normal    Volume:  Normal   Mood:    Anxious  Normal  Affect:    Appropriate   Thought Content:  Clear  Rumination   Thought Form:  Coherent  Logical   Insight:    Good     Diagnoses:  1. Generalized anxiety disorder    Consider social anxiety, as well/instead?    Collateral Reports Completed:  Will collaborate with care team as indicated during treatment    Plan: (Homework, other):  Patient was given information about behavioral services and encouraged to schedule a follow up appointment with the clinic [facility] as needed.  He was also given information about mental health symptoms and treatment options  and Cognitive Behavioral Therapy skills to practice when experiencing anxiety and depression.  CD Recommendations: No indications of CD issues. We agree to continue this course of psychotherapy to address his anxiety and developmental history.  AIDAN Robertson, [facility]    ______________________________________________________________________     [facility] Integrated [facility]al Health Treatment Plan     Client's Name: Elias Jarrell                   YOB: 1995     Date: February 25, 2019     DSM5 Diagnoses: (Sustained by DSM5 Criteria Listed Above)  Diagnoses:  300.02 (F41.1) Generalized Anxiety Disorder  Psychosocial & Contextual Factors: school stress, hx of childhood trauma  WHODAS  Score: 15     Referral / Collaboration:  Will collaborate with care team as indicated during treatment.     Anticipated number of session or this episode of care: 5-10        MeasurableTreatment Goal(s) related to diagnosis / functional impairment(s)  Goal 1:  Patient will experience a reduction in depressive and anxious symptoms, along with a corresponding increase in positive emotion and life satisfaction.     Objective #A: Patient will experience a reduction in depressed mood, will develop more effective coping skills to manage depressive symptoms, will develop healthy cognitive patterns and beliefs, will increase ability to function adaptively and will continue to take medications as prescribed / participate in supportive activities and services        Status: Continued - Date(s): 2/25/2019     Objective #B: Patient will experience a reduction in anxiety, will develop more effective coping skills to manage anxiety symptoms, will develop healthy cognitive patterns and beliefs and will increase ability to function adaptively  Status: Continued - Date(s): 2/25/2019     Objective #C: Patient will develop better understanding of triggers and coping strategies to stabilize mood  Status: Continued - Date(s): 2/25/2019     Goal 2:  Patient will identify and increase engagement in valued activity, i.e. improving social connections/relationships, pursuing occupational goals or personally meaningful pursuits, exploration of meaning in life.     Objective #A: Patient will identify meaningful activity in social, occupational and   personal goals, and increase behavioral activation around these goals           Status: Continued - Date(s): 2/25/2019     Objective #B: Patient will address relationship difficulties in a more adaptive manner  Status: Continued - Date(s): 2/25/2019     Objective #C: Patient will develop coping/problem-solving skills to facilitate more adaptive adjustment and will effectively address problems that  interfere with adaptive functioning  Status: Continued - Date(s): 2/25/2019     Goal 3:  Patient will process experience of childhood trauma and resolve any lingering concerns or issues related to this experience, as identified by patient.     Objective #A: Patient will develop better understanding of triggers and coping strategies to stabilize mood     Status: Continued - Date(s): 2/25/2019     Objective #B: Patient will effectively address problems that interfere with adaptive functioning  Status: Continued - Date(s): 2/25/2019     Objective #C: Patient will develop healthy cognitive patterns and beliefs  Status: Continued - Date(s): 2/25/2019     Possible Therapeutic Intervention(s)  Psycho-education regarding mental health diagnoses and treatment options     Eye-Movement Desensitization and Reprocessing Therapy     Clinical Hypnosis     Skills training    Explore skills useful to client in current situation.    Skills include assertiveness, communication, conflict management, problem-solving, relaxation, etc.     Solution-Focused Therapy    Explore patterns in patient's relationships and discuss options for new behaviors.    Explore patterns in patient's actions and choices and discuss options for new behaviors.     Cognitive-behavioral Therapy    Discuss common cognitive distortions, identify them in patient's life.    Explore ways to challenge, replace, and act against these cognitions.     Acceptance and Commitment Therapy    Explore and identify important values in patient's life.    Discuss ways to commit to behavioral activation around these values.     Psychodynamic psychotherapy    Discuss patient's emotional dynamics and issues and how they impact behaviors.    Explore patient's history of relationships and how they impact present behaviors.    Explore how to work with and make changes in these schemas and patterns.     Narrative Therapy    Explore the patient's story of his/her life from his/her  perspective.    Explore alternate ways of understanding their experience, identifying exceptions, developing new themes.     Interpersonal Psychotherapy    Explore patterns in relationships that are effective or ineffective at helping patient reach their goals, find satisfying experience.    Discuss new patterns or behaviors to engage in for improved social functioning.     Behavioral Activation    Discuss steps patient can take to become more involved in meaningful activity.    Identify barriers to these activities and explore possible solutions.     Mindfulness-Based Strategies    Discuss skills based on development and application of mindfulness.    Skills drawn from compassion-focused therapy, dialectical behavior therapy, mindfulness-based stress reduction, mindfulness-based cognitive therapy, etc.        We have developed these goals together during our work to this point. Patient has assisted in the development of these goals and has agreed to this treatment plan.         AIDAN Sanchez, [facility]             February 25, 2019

## 2019-03-16 ASSESSMENT — ANXIETY QUESTIONNAIRES: GAD7 TOTAL SCORE: 3

## 2019-03-16 ASSESSMENT — PATIENT HEALTH QUESTIONNAIRE - PHQ9: SUM OF ALL RESPONSES TO PHQ QUESTIONS 1-9: 5

## 2019-06-25 ENCOUNTER — TELEPHONE (OUTPATIENT)
Dept: FAMILY MEDICINE | Facility: CLINIC | Age: 24
End: 2019-06-25

## 2019-06-25 DIAGNOSIS — Z11.1 SCREENING EXAMINATION FOR PULMONARY TUBERCULOSIS: Primary | ICD-10-CM

## 2019-06-25 DIAGNOSIS — Z11.59 NEED FOR HEPATITIS B SCREENING TEST: ICD-10-CM

## 2019-06-25 NOTE — TELEPHONE ENCOUNTER
[facility] Call Center    Phone Message    May a detailed message be left on voicemail: no    Reason for Call: Order(s): Other:   Reason for requested: Hep B Titer and Quantgold TB Test  Date needed: Asap  Provider name: Dr. 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 Please call Pt back to confirm orders were placed.      Action Taken: Message routed to:  [facility] ([facility]): [facility]

## 2019-06-26 NOTE — TELEPHONE ENCOUNTER
Spoke to patient to relay lab orders were placed. Lab appointment was scheduled. Kylah London LPN 6/26/2019 8:37 AM

## 2019-07-01 DIAGNOSIS — Z11.59 NEED FOR HEPATITIS B SCREENING TEST: ICD-10-CM

## 2019-07-01 DIAGNOSIS — Z11.1 SCREENING EXAMINATION FOR PULMONARY TUBERCULOSIS: ICD-10-CM

## 2019-07-01 LAB — HBV SURFACE AB SERPL IA-ACNC: 273.85 M[IU]/ML

## 2019-07-01 PROCEDURE — 86481 TB AG RESPONSE T-CELL SUSP: CPT | Performed by: FAMILY MEDICINE

## 2019-07-01 PROCEDURE — 86706 HEP B SURFACE ANTIBODY: CPT | Performed by: FAMILY MEDICINE

## 2019-07-02 LAB
GAMMA INTERFERON BACKGROUND BLD IA-ACNC: 0.1 IU/ML
M TB IFN-G BLD-IMP: NEGATIVE
M TB IFN-G CD4+ BCKGRND COR BLD-ACNC: >10 IU/ML
MITOGEN IGNF BCKGRD COR BLD-ACNC: 0 IU/ML
MITOGEN IGNF BCKGRD COR BLD-ACNC: 0 IU/ML

## 2019-07-31 ENCOUNTER — TELEPHONE (OUTPATIENT)
Dept: FAMILY MEDICINE | Facility: CLINIC | Age: 24
End: 2019-07-31

## 2019-07-31 ENCOUNTER — OFFICE VISIT (OUTPATIENT)
Dept: FAMILY MEDICINE | Facility: CLINIC | Age: 24
End: 2019-07-31
Payer: COMMERCIAL

## 2019-07-31 VITALS
SYSTOLIC BLOOD PRESSURE: 129 MMHG | OXYGEN SATURATION: 98 % | RESPIRATION RATE: 16 BRPM | TEMPERATURE: 98.6 F | BODY MASS INDEX: 22.82 KG/M2 | HEART RATE: 64 BPM | HEIGHT: 71 IN | WEIGHT: 163 LBS | DIASTOLIC BLOOD PRESSURE: 82 MMHG

## 2019-07-31 DIAGNOSIS — Z11.3 SCREEN FOR STD (SEXUALLY TRANSMITTED DISEASE): ICD-10-CM

## 2019-07-31 DIAGNOSIS — R39.9 URINARY TRACT INFECTION SYMPTOMS: Primary | ICD-10-CM

## 2019-07-31 DIAGNOSIS — Z72.51 UNPROTECTED SEX: ICD-10-CM

## 2019-07-31 LAB
ALBUMIN UR-MCNC: NEGATIVE MG/DL
APPEARANCE UR: CLEAR
BILIRUB UR QL STRIP: NEGATIVE
COLOR UR AUTO: YELLOW
GLUCOSE UR STRIP-MCNC: NEGATIVE MG/DL
HGB UR QL STRIP: ABNORMAL
KETONES UR STRIP-MCNC: NEGATIVE MG/DL
LEUKOCYTE ESTERASE UR QL STRIP: NEGATIVE
NITRATE UR QL: NEGATIVE
PH UR STRIP: 6 PH (ref 5–7)
RBC #/AREA URNS AUTO: 1 /HPF (ref 0–2)
SOURCE: ABNORMAL
SP GR UR STRIP: 1.01 (ref 1–1.03)
UROBILINOGEN UR STRIP-MCNC: 0 MG/DL (ref 0–2)
WBC #/AREA URNS AUTO: <1 /HPF (ref 0–5)

## 2019-07-31 PROCEDURE — 87086 URINE CULTURE/COLONY COUNT: CPT | Performed by: NURSE PRACTITIONER

## 2019-07-31 PROCEDURE — 87591 N.GONORRHOEAE DNA AMP PROB: CPT | Performed by: NURSE PRACTITIONER

## 2019-07-31 PROCEDURE — 87491 CHLMYD TRACH DNA AMP PROBE: CPT | Performed by: NURSE PRACTITIONER

## 2019-07-31 ASSESSMENT — ENCOUNTER SYMPTOMS
FATIGUE: 0
DIFFICULTY URINATING: 0
FLANK PAIN: 0
FEVER: 0
DYSURIA: 1
HEMATURIA: 0
CHILLS: 0

## 2019-07-31 ASSESSMENT — PAIN SCALES - GENERAL: PAINLEVEL: NO PAIN (0)

## 2019-07-31 ASSESSMENT — MIFFLIN-ST. JEOR: SCORE: 1756.49

## 2019-07-31 NOTE — NURSING NOTE
Chief Complaint   Patient presents with     UTI     Pt complains on pain on urination for the past 5 days.         Nba Moseley, EMT on 7/31/2019 at 8:19 AM

## 2019-07-31 NOTE — PROGRESS NOTES
"       HPI       Elias Jarrell is a 24 year old man who presents for the new concern(s) of:UTI symptoms. Elias is a generally healthy 24 year old not taking any medication at this time. He had an unprotected sexual encounter 3 weeks ago. The current symptoms he has (burning with urination and general burning at the tip of his penis) started about 1 week ago. No history of STIs. No history of UTIs.   Chief Complaint   Patient presents with     UTI     Pt complains on pain on urination for the past 5 days.         Concern:UTI smptoms   Description of the problem :UTI symptoms with urination and in general for the past week, denies discharge or any other symptoms besides burning.      Problem, Medication and Allergy Lists were reviewed and updated if needed..    Patient is an established patient of this clinic..           Review of Systems:   Review of Systems     Constitutional:  Negative for fever, chills and fatigue.   Genitourinary:  Positive for dysuria. Negative for bladder incontinence, urgency, hematuria, flank pain, difficulty urinating, nocturia and voiding less frequently.               Physical Exam:     Vitals:    07/31/19 0818   BP: 129/82   BP Location: Right arm   Patient Position: Sitting   Cuff Size: Adult Regular   Pulse: 64   Resp: 16   Temp: 98.6  F (37  C)   TempSrc: Oral   SpO2: 98%   Weight: 73.9 kg (163 lb)   Height: 1.803 m (5' 11\")     Body mass index is 22.73 kg/m .  Vitals were reviewed and were normal     Physical Exam   Constitutional: He is oriented to person, place, and time. He appears well-developed and well-nourished.   HENT:   Head: Normocephalic.   Neck: Normal range of motion. Neck supple.   Abdominal: Soft. Bowel sounds are normal. He exhibits no distension. There is no tenderness.   Musculoskeletal: Normal range of motion.   Neurological: He is alert and oriented to person, place, and time.   Skin: Skin is warm and dry.   Psychiatric: He has a normal mood and affect. His " behavior is normal.       Results:     UA and (G and C) pending    Assessment and Plan     1. Urinary tract infection symptoms    - UA with Micro reflex to Culture; Future  - UA with Microscopic reflex to Culture    2. Screen for STD (sexually transmitted disease)    - N. gonorrhea PCR - Urine, Clinic Collect  - C. trachomatis PCR - Urine, Lab Collect; Future  - C. trachomatis PCR - Urine, Lab Collect    3. Unprotected sex    There are no discontinued medications.  Total time spent 25 minutes.  More than 50% of the time spent with patient on counseling / coordinating his/her care. First, increase water intake. Next, tests are pending, you will be notified of results and care plan. Finally, take Ibuprofen and/or tylenol as needed for pain and discomfort. Return with worsening or persisting symptoms. Options for treatment and follow-up care were reviewed with the patient. Elias Jarrell  engaged in the decision making process and verbalized understanding of the options discussed and agreed with the final plan.  Chayito Drummond NP

## 2019-07-31 NOTE — TELEPHONE ENCOUNTER
----- Message from Chayito Drummond NP sent at 7/31/2019  9:58 AM CDT -----  Please have lab do a culture on this urine with blood in it. Also, please let Elias know his urine shows a small amount of blood, but otherwise looks ok. We will culture his urine as well. Let him know the gonorrhea and chlamydia tests are still pending. Continue same things we talked about. Thank you.

## 2019-07-31 NOTE — PATIENT INSTRUCTIONS
First, UA and STI screening tests are pending. I will notify you of the Urinalysis report and whether treatment is required. Next, increase water intake, take Tylenol or Ibuprofen as needed. Finally return with worsening or persisting symptoms.   [facility] Medication Refill Request Information:  * Please contact your pharmacy regarding ANY request for medication refills.  ** NP Clinic Prescription Fax = [phone]  * Please allow 3 business days for routine medication refills.  * Please allow 5 business days for controlled substance medication refills.     [facility] Test Result notification information:  *You will be notified with in 7-10 days of your appointment day regarding the results of your test.  If you are on MyChart you will be notified as soon as the provider has reviewed the results and signed off on them.    [facility]: [phone]

## 2019-07-31 NOTE — TELEPHONE ENCOUNTER
Called pt and left a voicemail to update him. We will call once results are all in likely by tomorrow, 8/1/19.     Nba Moseley, EMT on 7/31/2019 at 10:15 AM

## 2019-08-01 ENCOUNTER — TELEPHONE (OUTPATIENT)
Dept: FAMILY MEDICINE | Facility: CLINIC | Age: 24
End: 2019-08-01

## 2019-08-01 NOTE — TELEPHONE ENCOUNTER
Left message for Elias to let him know that his results are coming back negative. UC still pending. I also asked him to call or send me a my chart message if his symptoms are not improving.   AAYUSH Smith, NOAMP

## 2019-08-02 ENCOUNTER — TELEPHONE (OUTPATIENT)
Dept: FAMILY MEDICINE | Facility: CLINIC | Age: 24
End: 2019-08-02

## 2019-08-02 LAB
BACTERIA SPEC CULT: NO GROWTH
Lab: NORMAL
SPECIMEN SOURCE: NORMAL

## 2019-08-02 NOTE — TELEPHONE ENCOUNTER
I called Elias to let him know that his UC was also negative. Elias reported that he is feeling better. He offered no further questions.   AAYUSH Smith, FNP

## 2020-03-11 ENCOUNTER — HEALTH MAINTENANCE LETTER (OUTPATIENT)
Age: 25
End: 2020-03-11

## 2021-01-03 ENCOUNTER — HEALTH MAINTENANCE LETTER (OUTPATIENT)
Age: 26
End: 2021-01-03

## 2021-04-25 ENCOUNTER — HEALTH MAINTENANCE LETTER (OUTPATIENT)
Age: 26
End: 2021-04-25

## 2021-10-10 ENCOUNTER — HEALTH MAINTENANCE LETTER (OUTPATIENT)
Age: 26
End: 2021-10-10

## 2022-05-21 ENCOUNTER — HEALTH MAINTENANCE LETTER (OUTPATIENT)
Age: 27
End: 2022-05-21

## 2022-09-18 ENCOUNTER — HEALTH MAINTENANCE LETTER (OUTPATIENT)
Age: 27
End: 2022-09-18

## 2023-06-04 ENCOUNTER — HEALTH MAINTENANCE LETTER (OUTPATIENT)
Age: 28
End: 2023-06-04